# Patient Record
Sex: FEMALE | Race: OTHER | URBAN - METROPOLITAN AREA
[De-identification: names, ages, dates, MRNs, and addresses within clinical notes are randomized per-mention and may not be internally consistent; named-entity substitution may affect disease eponyms.]

---
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
## 2023-08-07 NOTE — ED PROVIDER NOTE - NS ED ROS FT
GEN:  no fever, no chills, +generalized weakness  NEURO:  no headache, no dizziness  ENT: +sore throat, +runny nose  CV:  +chest tightness, no chest pain, no palpitations  RESP:  +sob, +cough  GI:  no nausea, no vomiting, no abdominal pain, no diarrhea  :  no dysuria, no urinary frequency, no hematuria  MSK:  no joint pain, no edema  SKIN:  no rash, no bruising  HEME: no hematochezia, no melena
show

## 2025-01-28 NOTE — ED PEDIATRIC NURSE NOTE - DIAGNOSIS
Blood pressure is stable at 1 20/60 mm Hg continue on present therapy   (3) Alterations in Oxygenation (Respiratory Diagnosis, Dehydration, Anemia, Anorexia, Syncope/Dizziness, etc.)